# Patient Record
Sex: FEMALE | Race: ASIAN | NOT HISPANIC OR LATINO | URBAN - METROPOLITAN AREA
[De-identification: names, ages, dates, MRNs, and addresses within clinical notes are randomized per-mention and may not be internally consistent; named-entity substitution may affect disease eponyms.]

---

## 2020-03-25 ENCOUNTER — EMERGENCY (EMERGENCY)
Facility: HOSPITAL | Age: 30
LOS: 1 days | Discharge: HOME | End: 2020-03-25
Attending: EMERGENCY MEDICINE
Payer: MEDICAID

## 2020-03-25 VITALS
RESPIRATION RATE: 18 BRPM | HEART RATE: 114 BPM | OXYGEN SATURATION: 96 % | WEIGHT: 164.91 LBS | HEIGHT: 59 IN | TEMPERATURE: 102 F | SYSTOLIC BLOOD PRESSURE: 101 MMHG | DIASTOLIC BLOOD PRESSURE: 73 MMHG

## 2020-03-25 VITALS — OXYGEN SATURATION: 97 % | TEMPERATURE: 100 F | RESPIRATION RATE: 15 BRPM | HEART RATE: 87 BPM

## 2020-03-25 PROCEDURE — 99283 EMERGENCY DEPT VISIT LOW MDM: CPT

## 2020-03-25 RX ORDER — ACETAMINOPHEN 500 MG
975 TABLET ORAL ONCE
Refills: 0 | Status: COMPLETED | OUTPATIENT
Start: 2020-03-25 | End: 2020-03-25

## 2020-03-25 RX ADMIN — Medication 975 MILLIGRAM(S): at 04:42

## 2020-03-25 NOTE — ED PROVIDER NOTE - NSFOLLOWUPINSTRUCTIONS_ED_ALL_ED_FT
COVID-19 testing are currently being prioritized at [facility] for admitted patients. Patients that are stable for discharged from the ED will have COVID-19 testing performed within 7 days at which time most symptoms should improve.  For that reason, although there is suspicion that you may have Coronavirus, you may not have been tested today. Please follow instruction on provided COVID-19 discharge educational forms and self quarantine for 14 days. In addition, you have been placed on our surveillance tracker. Return to the ED immediately if you have shortness of breath, fever, pain, weakness, vomiting any concerns.   Drink plenty of fluids and take Tylenol 2 tabs every 6 hours for fever/body aches.

## 2020-03-25 NOTE — ED PROVIDER NOTE - CLINICAL SUMMARY MEDICAL DECISION MAKING FREE TEXT BOX
Pt is nontoxic appearing, not hypoxic, no respiratory distress, not hypotensives. High suspicion for COVID 19 given pandemic. Pt instructed on self quarantine and monitoring. Return precautions explained.  Pt is well appearing, has no new complaints and able to walk with normal gait. Pt is stable for discharge and follow up with medical doctor. Pt educated on care and need for follow up. Discussed anticipatory guidance and return precautions. Questions answered. I had a detailed discussion with the patient and/or guardian regarding the historical points, exam findings, and any diagnostic results supporting the discharge diagnosis.

## 2020-03-25 NOTE — ED PROVIDER NOTE - OBJECTIVE STATEMENT
Chief complaint of fever, chills body aches, nonproductive coughing x 6 days.   Pt denies pregnancy, states LMP, normal cycles.

## 2020-03-25 NOTE — ED ADULT NURSE NOTE - OBJECTIVE STATEMENT
pt c/o of fever and cough x 1 week. Fever is on and off. No recent travel but mother is sick at home

## 2023-11-16 ENCOUNTER — EMERGENCY (EMERGENCY)
Facility: HOSPITAL | Age: 33
LOS: 1 days | Discharge: HOME | End: 2023-11-16
Attending: EMERGENCY MEDICINE
Payer: MEDICAID

## 2023-11-16 VITALS
TEMPERATURE: 98 F | DIASTOLIC BLOOD PRESSURE: 63 MMHG | HEIGHT: 59 IN | WEIGHT: 173.94 LBS | HEART RATE: 96 BPM | OXYGEN SATURATION: 99 % | RESPIRATION RATE: 18 BRPM | SYSTOLIC BLOOD PRESSURE: 109 MMHG

## 2023-11-16 PROCEDURE — 99284 EMERGENCY DEPT VISIT MOD MDM: CPT

## 2023-11-16 PROCEDURE — 71046 X-RAY EXAM CHEST 2 VIEWS: CPT | Mod: 26

## 2023-11-16 RX ORDER — IPRATROPIUM/ALBUTEROL SULFATE 18-103MCG
3 AEROSOL WITH ADAPTER (GRAM) INHALATION ONCE
Refills: 0 | Status: COMPLETED | OUTPATIENT
Start: 2023-11-16 | End: 2023-11-16

## 2023-11-16 RX ADMIN — Medication 3 MILLILITER(S): at 23:28

## 2023-11-16 NOTE — ED ADULT NURSE NOTE - OBJECTIVE STATEMENT
As per pt. c/o coughing, sob and difficulty breathing. Denies throat pain, fever and all other symptoms

## 2023-11-16 NOTE — ED ADULT TRIAGE NOTE - CHIEF COMPLAINT QUOTE
Patient states " I have a cough for more than 2 weeks. My doctor said I have infection in my throat." Patient reports she has been taking Amoxil/Clavul 500-125 since 11/9/23 with no improvement. c

## 2023-11-16 NOTE — ED ADULT NURSE NOTE - NSFALLUNIVINTERV_ED_ALL_ED
Bed/Stretcher in lowest position, wheels locked, appropriate side rails in place/Call bell, personal items and telephone in reach/Instruct patient to call for assistance before getting out of bed/chair/stretcher/Non-slip footwear applied when patient is off stretcher/[place] to call system/Physically safe environment - no spills, clutter or unnecessary equipment/Purposeful proactive rounding/Room/bathroom lighting operational, light cord in reach

## 2023-11-17 VITALS
TEMPERATURE: 99 F | DIASTOLIC BLOOD PRESSURE: 79 MMHG | HEART RATE: 80 BPM | OXYGEN SATURATION: 99 % | SYSTOLIC BLOOD PRESSURE: 116 MMHG | RESPIRATION RATE: 18 BRPM

## 2023-11-17 PROBLEM — Z78.9 OTHER SPECIFIED HEALTH STATUS: Chronic | Status: ACTIVE | Noted: 2020-03-25

## 2023-11-17 PROCEDURE — 96374 THER/PROPH/DIAG INJ IV PUSH: CPT

## 2023-11-17 PROCEDURE — 71046 X-RAY EXAM CHEST 2 VIEWS: CPT

## 2023-11-17 PROCEDURE — 99284 EMERGENCY DEPT VISIT MOD MDM: CPT | Mod: 25

## 2023-11-17 PROCEDURE — 94640 AIRWAY INHALATION TREATMENT: CPT

## 2023-11-17 RX ORDER — PROMETHAZINE HCL/CODEINE
5 SYRUP ORAL
Qty: 50 | Refills: 0
Start: 2023-11-17 | End: 2023-11-21

## 2023-11-17 RX ORDER — FLUTICASONE PROPIONATE 50 MCG
1 SPRAY, SUSPENSION NASAL
Qty: 1 | Refills: 0
Start: 2023-11-17 | End: 2023-11-23

## 2023-11-17 RX ORDER — DEXTROMETHORPHAN HYDROBROMIDE AND PROMETHAZINE HYDROCHLORIDE 15; 6.25 MG/5ML; MG/5ML
5 SYRUP ORAL
Qty: 100 | Refills: 0
Start: 2023-11-17 | End: 2023-11-23

## 2023-11-17 RX ORDER — ALBUTEROL 90 UG/1
2 AEROSOL, METERED ORAL
Qty: 1 | Refills: 2
Start: 2023-11-17 | End: 2024-02-14

## 2023-11-17 RX ORDER — DEXAMETHASONE 0.5 MG/5ML
6 ELIXIR ORAL ONCE
Refills: 0 | Status: COMPLETED | OUTPATIENT
Start: 2023-11-17 | End: 2023-11-17

## 2023-11-17 RX ADMIN — Medication 6 MILLIGRAM(S): at 02:19

## 2023-11-17 NOTE — ED PROVIDER NOTE - OBJECTIVE STATEMENT
31 yo female with no PMH presents with cough x 2 weeks.  Pt finished azithromycin and is on day 7 of Augmentin but still coughing.  denies cigarettes/marijuana.

## 2023-11-17 NOTE — ED PROVIDER NOTE - PATIENT PORTAL LINK FT
You can access the FollowMyHealth Patient Portal offered by [facility] by registering at the following website: [email or web address]. By joining [employer]’s FollowMyHealth portal, you will also be able to view your health information using other applications (apps) compatible with our system.

## 2023-11-17 NOTE — ED PROVIDER NOTE - PHYSICAL EXAMINATION
General: Well appearing, no acute distress, appears stated age  HEENT: normocephalic, atraumatic   Respiratory: normal work of breathing, clear no wheeze/ rales/rhonchi   MSK: no swelling or tenderness of lower extremities, moving all extremities spontaneously   Skin: warm, dry  Neuro: A&Ox3, cranial nerves II-XII intact, 5/5 strength in all extremities, no sensory deficits, normal gait   Psych: appropriate affect

## 2023-11-17 NOTE — ED PROVIDER NOTE - NSFOLLOWUPINSTRUCTIONS_ED_ALL_ED_FT
1. Use the nebulizer  or  albuterol inhaler  every 4 hours if you are coughing.  You were given steroids here via injection in your arm.  2.   At times cough is caused by postnasal drip… A runny nose… Use the Flonase in your nostril both nostrils once a day for 10 days.  3.   2 different cough syrups are at your pharmacy… 1 with codeine that you should only use at night and 1 without codeine that you could use throughout the day  4.  try the bendonate tablets as they may also help your cough.  5 finish your antibiotics   6. DOWNLOAD FOLLOW MY HEALTH STACY TO GET YOUR COVID/FLU results   Acute Cough    WHAT YOU NEED TO KNOW:    An acute cough can last up to 3 weeks. Common causes of an acute cough include a cold, allergies, or a lung infection.     DISCHARGE INSTRUCTIONS:    Return to the emergency department if:     You have trouble breathing or feel short of breath.      You cough up blood, or you see blood in your mucus.       You faint or feel weak or dizzy.       You have chest pain when you cough or take a deep breath.       You have new wheezing.     Contact your healthcare provider if:     You have a fever.       Your cough lasts longer than 4 weeks.       Your symptoms do not improve with treatment.       You have questions or concerns about your condition or care.     Medicines:     Medicines may be needed to stop the cough, decrease swelling in your airways, or help open your airways. Medicine may also be given to help you cough up mucus. Ask your healthcare provider what over-the-counter medicines you can take. If you have an infection caused by bacteria, you may need antibiotics.       Take your medicine as directed. Contact your healthcare provider if you think your medicine is not helping or if you have side effects. Tell him or her if you are allergic to any medicine. Keep a list of the medicines, vitamins, and herbs you take. Include the amounts, and when and why you take them. Bring the list or the pill bottles to follow-up visits. Carry your medicine list with you in case of an emergency.    Manage your symptoms:     Do not smoke and stay away from others who smoke. Nicotine and other chemicals in cigarettes and cigars can cause lung damage and make your cough worse. Ask your healthcare provider for information if you currently smoke and need help to quit. E-cigarettes or smokeless tobacco still contain nicotine. Talk to your healthcare provider before you use these products.       Drink extra liquids as directed. Liquids will help thin and loosen mucus so you can cough it up. Liquids will also help prevent dehydration. Examples of good liquids to drink include water, fruit juice, and broth. Do not drink liquids that contain caffeine. Caffeine can increase your risk for dehydration. Ask your healthcare provider how much liquid to drink each day.       Rest as directed. Do not do activities that make your cough worse, such as exercise.       Use a humidifier or vaporizer. Use a cool mist humidifier or a vaporizer to increase air moisture in your home. This may make it easier for you to breathe and help decrease your cough.       Eat 2 to 5 mL of honey 2 times each day. Honey can help thin mucus and decrease your cough.       Use cough drops or lozenges. These can help decrease throat irritation and your cough.     Follow up with your healthcare provider as directed: Write down your questions so you remember to ask them during your visits.

## 2024-04-23 NOTE — ED PROVIDER NOTE - PATIENT PORTAL LINK FT
yes
You can access the FollowMyHealth Patient Portal offered by [facility] by registering at the following website: [email or web address]. By joining [employer]’s FollowMyHealth portal, you will also be able to view your health information using other applications (apps) compatible with our system.